# Patient Record
Sex: FEMALE | URBAN - METROPOLITAN AREA
[De-identification: names, ages, dates, MRNs, and addresses within clinical notes are randomized per-mention and may not be internally consistent; named-entity substitution may affect disease eponyms.]

---

## 2022-04-07 ENCOUNTER — APPOINTMENT (RX ONLY)
Dept: URBAN - METROPOLITAN AREA CLINIC 113 | Facility: CLINIC | Age: 42
Setting detail: DERMATOLOGY
End: 2022-04-07

## 2022-04-07 DIAGNOSIS — L81.4 OTHER MELANIN HYPERPIGMENTATION: ICD-10-CM

## 2022-04-07 DIAGNOSIS — L90.8 OTHER ATROPHIC DISORDERS OF SKIN: ICD-10-CM

## 2022-04-07 DIAGNOSIS — L64.8 OTHER ANDROGENIC ALOPECIA: ICD-10-CM

## 2022-04-07 DIAGNOSIS — L90.6 STRIAE ATROPHICAE: ICD-10-CM

## 2022-04-07 DIAGNOSIS — L70.8 OTHER ACNE: ICD-10-CM

## 2022-04-07 PROCEDURE — 99203 OFFICE O/P NEW LOW 30 MIN: CPT

## 2022-04-07 PROCEDURE — ? PRESCRIPTION

## 2022-04-07 PROCEDURE — ? DYSPORT

## 2022-04-07 PROCEDURE — ? COUNSELING

## 2022-04-07 PROCEDURE — ? TREATMENT REGIMEN

## 2022-04-07 RX ORDER — TRETIONIN 0.25 MG/G
CREAM TOPICAL
Qty: 20 | Refills: 5 | Status: ERX | COMMUNITY
Start: 2022-04-07

## 2022-04-07 RX ORDER — SPIRONOLACTONE 50 MG/1
TABLET, FILM COATED ORAL
Qty: 90 | Refills: 0 | Status: ERX | COMMUNITY
Start: 2022-04-07

## 2022-04-07 RX ADMIN — SPIRONOLACTONE: 50 TABLET, FILM COATED ORAL at 00:00

## 2022-04-07 RX ADMIN — TRETIONIN: 0.25 CREAM TOPICAL at 00:00

## 2022-04-07 ASSESSMENT — LOCATION DETAILED DESCRIPTION DERM
LOCATION DETAILED: RIGHT LATERAL BREAST 6-7:00 REGION
LOCATION DETAILED: LEFT MEDIAL BREAST 7-8:00 REGION
LOCATION DETAILED: RIGHT CENTRAL MALAR CHEEK
LOCATION DETAILED: LEFT LATERAL BREAST 5-6:00 REGION
LOCATION DETAILED: GLABELLA
LOCATION DETAILED: MID-FRONTAL SCALP
LOCATION DETAILED: SUPERIOR MID FOREHEAD
LOCATION DETAILED: LEFT CENTRAL MALAR CHEEK

## 2022-04-07 ASSESSMENT — LOCATION ZONE DERM
LOCATION ZONE: FACE
LOCATION ZONE: TRUNK
LOCATION ZONE: SCALP

## 2022-04-07 ASSESSMENT — LOCATION SIMPLE DESCRIPTION DERM
LOCATION SIMPLE: LEFT CHEEK
LOCATION SIMPLE: GLABELLA
LOCATION SIMPLE: ANTERIOR SCALP
LOCATION SIMPLE: LEFT BREAST
LOCATION SIMPLE: RIGHT CHEEK
LOCATION SIMPLE: RIGHT BREAST
LOCATION SIMPLE: SUPERIOR FOREHEAD

## 2022-04-07 NOTE — HPI: HAIR LOSS
Previous Labs: Yes
How Did The Hair Loss Occur?: gradual in onset
How Severe Is Your Hair Loss?: moderate
Lab Details: low iron and vitamin D

## 2022-04-07 NOTE — PROCEDURE: TREATMENT REGIMEN
Detail Level: Detailed
Otc Regimen: Rogaine or minoxidil solution or foam daily as directed
Initiate Treatment: Spironolactone 50mg daily
Plan: Could be a mild form of HS, but no inflammatory lesions.  Patient may manually extract lesions when they occur. If condition worsens, return for further evaluation

## 2022-04-07 NOTE — PROCEDURE: DYSPORT
Lot #: [id number]
Left Periorbital Skin Units: 0
Glabellar Complex Units: 20
Show Topical Anesthesia: Yes
Post-Care Instructions: Patient instructed to not lie down for 4 hours and limit physical activity for 24 hours.
Show Lcl Units: No
Forehead Units: 30
Expiration Date (Month Year): 09/3022
Consent: Written consent obtained. Risks include but not limited to lid/brow ptosis, bruising, swelling, diplopia, temporary effect, incomplete chemical denervation.
Dilution (U/0.1 Cc): 10
Detail Level: Detailed

## 2022-04-11 ENCOUNTER — RX ONLY (OUTPATIENT)
Age: 42
Setting detail: RX ONLY
End: 2022-04-11

## 2022-04-15 ENCOUNTER — APPOINTMENT (RX ONLY)
Dept: URBAN - METROPOLITAN AREA CLINIC 88 | Facility: CLINIC | Age: 42
Setting detail: DERMATOLOGY
End: 2022-04-15

## 2022-04-15 DIAGNOSIS — L90.8 OTHER ATROPHIC DISORDERS OF SKIN: ICD-10-CM

## 2022-04-15 PROCEDURE — ? ADDITIONAL NOTES

## 2022-04-15 PROCEDURE — ? COUNSELING

## 2022-04-15 PROCEDURE — ? DYSPORT

## 2022-04-15 ASSESSMENT — LOCATION SIMPLE DESCRIPTION DERM
LOCATION SIMPLE: SUPERIOR FOREHEAD
LOCATION SIMPLE: LEFT EYELID
LOCATION SIMPLE: RIGHT EYELID

## 2022-04-15 ASSESSMENT — LOCATION DETAILED DESCRIPTION DERM
LOCATION DETAILED: SUPERIOR MID FOREHEAD
LOCATION DETAILED: LEFT LATERAL CANTHUS
LOCATION DETAILED: RIGHT LATERAL CANTHUS

## 2022-04-15 ASSESSMENT — LOCATION ZONE DERM
LOCATION ZONE: EYELID
LOCATION ZONE: FACE

## 2022-04-15 NOTE — PROCEDURE: ADDITIONAL NOTES
Detail Level: Simple
Render Risk Assessment In Note?: no
Additional Notes: Complimentary 20 units to forehead per dr. Arriola

## 2022-04-15 NOTE — PROCEDURE: DYSPORT
Show Masseter Units: Yes
Periorbital Skin Units: 20
Show Right And Left Brow Units: No
Mentalis Units: 0
Consent: Written consent obtained. Risks include but not limited to lid/brow ptosis, bruising, swelling, diplopia, temporary effect, incomplete chemical denervation.
Price (Use Numbers Only, No Special Characters Or $): 92
Post-Care Instructions: Patient instructed to not lie down for 4 hours and limit physical activity for 24 hours.
Detail Level: Detailed
Dilution (U/0.1 Cc): 10

## 2022-04-22 ENCOUNTER — APPOINTMENT (RX ONLY)
Dept: URBAN - METROPOLITAN AREA CLINIC 88 | Facility: CLINIC | Age: 42
Setting detail: DERMATOLOGY
End: 2022-04-22

## 2022-04-22 DIAGNOSIS — Z41.9 ENCOUNTER FOR PROCEDURE FOR PURPOSES OTHER THAN REMEDYING HEALTH STATE, UNSPECIFIED: ICD-10-CM

## 2022-04-22 PROCEDURE — ? ADDITIONAL NOTES

## 2022-04-22 NOTE — PROCEDURE: ADDITIONAL NOTES
Render Risk Assessment In Note?: no
Detail Level: Simple
Additional Notes: Pt was given complimentary 10 units per Li.

## 2022-04-22 NOTE — PROCEDURE: MIPS QUALITY
Detail Level: Detailed
Quality 402: Tobacco Use And Help With Quitting Among Adolescents: Tobacco Screening OR Tobacco Cessation Intervention not Performed Reason Not Otherwise Specified
Quality 226: Preventive Care And Screening: Tobacco Use: Screening And Cessation Intervention: Tobacco Screening not Performed for Medical Reasons

## 2023-01-20 ENCOUNTER — RX ONLY (OUTPATIENT)
Age: 43
Setting detail: RX ONLY
End: 2023-01-20

## 2023-01-20 ENCOUNTER — APPOINTMENT (RX ONLY)
Dept: URBAN - METROPOLITAN AREA CLINIC 88 | Facility: CLINIC | Age: 43
Setting detail: DERMATOLOGY
End: 2023-01-20

## 2023-01-20 DIAGNOSIS — Z41.9 ENCOUNTER FOR PROCEDURE FOR PURPOSES OTHER THAN REMEDYING HEALTH STATE, UNSPECIFIED: ICD-10-CM

## 2023-01-20 DIAGNOSIS — L90.8 OTHER ATROPHIC DISORDERS OF SKIN: ICD-10-CM

## 2023-01-20 DIAGNOSIS — L65.9 NONSCARRING HAIR LOSS, UNSPECIFIED: ICD-10-CM

## 2023-01-20 PROCEDURE — 99213 OFFICE O/P EST LOW 20 MIN: CPT

## 2023-01-20 PROCEDURE — ? DYSPORT

## 2023-01-20 PROCEDURE — ? ADDITIONAL NOTES

## 2023-01-20 PROCEDURE — ? TREATMENT REGIMEN

## 2023-01-20 PROCEDURE — ? COUNSELING

## 2023-01-20 RX ORDER — SPIRONOLACTONE 50 MG/1
TABLET, FILM COATED ORAL
Qty: 90 | Refills: 0 | Status: ERX | COMMUNITY
Start: 2023-01-20

## 2023-01-20 RX ORDER — TRETIONIN 0.5 MG/G
CREAM TOPICAL
Qty: 20 | Refills: 0 | Status: ERX | COMMUNITY
Start: 2023-01-20

## 2023-01-20 ASSESSMENT — LOCATION ZONE DERM
LOCATION ZONE: FACE
LOCATION ZONE: SCALP

## 2023-01-20 ASSESSMENT — LOCATION DETAILED DESCRIPTION DERM
LOCATION DETAILED: RIGHT SUPERIOR CENTRAL MALAR CHEEK
LOCATION DETAILED: GLABELLA
LOCATION DETAILED: LEFT SUPERIOR OCCIPITAL SCALP
LOCATION DETAILED: RIGHT INFERIOR CENTRAL MALAR CHEEK
LOCATION DETAILED: LEFT SUPERIOR LATERAL MALAR CHEEK
LOCATION DETAILED: RIGHT FOREHEAD
LOCATION DETAILED: RIGHT SUPERIOR PARIETAL SCALP
LOCATION DETAILED: LEFT INFERIOR PREAURICULAR CHEEK
LOCATION DETAILED: LEFT INFERIOR FOREHEAD

## 2023-01-20 ASSESSMENT — LOCATION SIMPLE DESCRIPTION DERM
LOCATION SIMPLE: RIGHT CHEEK
LOCATION SIMPLE: LEFT OCCIPITAL SCALP
LOCATION SIMPLE: LEFT CHEEK
LOCATION SIMPLE: LEFT FOREHEAD
LOCATION SIMPLE: GLABELLA
LOCATION SIMPLE: SCALP
LOCATION SIMPLE: RIGHT FOREHEAD

## 2023-01-20 NOTE — PROCEDURE: TREATMENT REGIMEN
Initiate Treatment: Spironolactone 50mg once daily \\nMinoxidil solution once daily
Detail Level: Zone

## 2023-01-20 NOTE — PROCEDURE: ADDITIONAL NOTES
Additional Notes: Patient is a good candidate for thread lifting in the cheeks and quoted $1000 for procedure in addition to cost of each thread. Patient aware many threads will be needed. \\nPatient recommended to have a face lift with plastic surgeon for the type of correction she is looking for.
Render Risk Assessment In Note?: no
Detail Level: Simple

## 2023-01-20 NOTE — PROCEDURE: DYSPORT
R Brow Units: 0
Detail Level: Simple
Show Lateral Platysmal Band Units: Yes
Glabellar Complex Units: 30
Show Ucl Units: No
Show Inventory Tab: Show
Consent: Written consent obtained. Risks include but not limited to lid/brow ptosis, bruising, swelling, diplopia, temporary effect, incomplete chemical denervation.
Post-Care Instructions: Patient instructed to not lie down for 4 hours and limit physical activity for 24 hours.
Dilution (U/0.1 Cc): 10
Topical Anesthesia?: ice
Forehead Units: 40

## 2023-02-17 ENCOUNTER — APPOINTMENT (RX ONLY)
Dept: URBAN - METROPOLITAN AREA CLINIC 88 | Facility: CLINIC | Age: 43
Setting detail: DERMATOLOGY
End: 2023-02-17

## 2023-02-17 DIAGNOSIS — Z41.9 ENCOUNTER FOR PROCEDURE FOR PURPOSES OTHER THAN REMEDYING HEALTH STATE, UNSPECIFIED: ICD-10-CM

## 2023-02-17 PROCEDURE — ? DEFER

## 2023-02-17 PROCEDURE — ? ADDITIONAL NOTES

## 2023-02-17 PROCEDURE — ? RHA REDENSITY INJECTION

## 2023-02-17 PROCEDURE — ? INVENTORY

## 2023-02-17 PROCEDURE — ? DYSPORT

## 2023-02-17 NOTE — PROCEDURE: DEFER
X Size Of Lesion In Cm (Optional): 0
Introduction Text (Please End With A Colon): The following procedure was deferred:
Detail Level: Detailed
Procedure To Be Performed At Next Visit: Filler: Restylane
Other Procedure: Quan Gutierrez
Instructions (Optional): Patient quoted $750 per syringe of filler for the cheeks, recommended at least 2 syringes.

## 2023-02-17 NOTE — PROCEDURE: DYSPORT
Left Pupillary Line Units: 0
Show Masseter Units: Yes
Post-Care Instructions: Patient instructed to not lie down for 4 hours and limit physical activity for 24 hours.\\n\\nLot #: [id number]\\nExpiration Date: 4/30/2023
Show Right And Left Periorbital Units: No
Detail Level: Simple
Dilution (U/0.1 Cc): 10
Show Inventory Tab: Show
Forehead Units: 15
Consent: Verbal consent obtained. Risks include but not limited to lid/brow ptosis, bruising, swelling, diplopia, temporary effect, incomplete chemical denervation.

## 2023-02-17 NOTE — PROCEDURE: ADDITIONAL NOTES
Detail Level: Simple
Additional Notes: Lady Lift Plan\\nDate: 2/17/23\\nTreatment Plan: 15 units of Dysport, 1cc of RHA Redensity\\nQuote: $637.50\\nPaid Today: $637.50\\nBalance Owe: $0
Render Risk Assessment In Note?: no
Additional Notes: Patient has passed 2 week Dysport window. Will provide patient with 50% discount on Dysport only per Dr. Arriola.

## 2023-02-17 NOTE — PROCEDURE: RHA REDENSITY INJECTION
Number Of Syringes (Required For Inventory): 1
Additional Area 5 Volume In Cc: 0
Show Inventory Tab: Hide
Topical Anesthesia?: 7% tetracaine, 23% lidocaine
Map Statment: See Attach Map for Complete Details
Consent: Verbal consent obtained. Risks include but not limited to bruising, beading, irregular texture, ulceration, infection, allergic reaction, scar formation, incomplete augmentation, temporary nature, procedural pain.
Procedural Text: The filler was administered to the treatment areas noted above.
Detail Level: Detailed
Filler: RHA Redensity
Post-Care Instructions: Patient instructed to apply ice to reduce swelling.\\n\\nLot #: [id number]\\nExpiration Date: 3/1/2025
Include Cannula Information In Note?: No

## 2023-08-25 ENCOUNTER — APPOINTMENT (RX ONLY)
Dept: URBAN - METROPOLITAN AREA CLINIC 88 | Facility: CLINIC | Age: 43
Setting detail: DERMATOLOGY
End: 2023-08-25

## 2023-08-25 DIAGNOSIS — Z41.9 ENCOUNTER FOR PROCEDURE FOR PURPOSES OTHER THAN REMEDYING HEALTH STATE, UNSPECIFIED: ICD-10-CM

## 2023-08-25 PROCEDURE — ? DYSPORT

## 2023-08-25 PROCEDURE — ? INVENTORY

## 2023-08-25 ASSESSMENT — LOCATION DETAILED DESCRIPTION DERM
LOCATION DETAILED: RIGHT FOREHEAD
LOCATION DETAILED: GLABELLA
LOCATION DETAILED: LEFT FOREHEAD

## 2023-08-25 ASSESSMENT — LOCATION SIMPLE DESCRIPTION DERM
LOCATION SIMPLE: GLABELLA
LOCATION SIMPLE: RIGHT FOREHEAD
LOCATION SIMPLE: LEFT FOREHEAD

## 2023-08-25 ASSESSMENT — LOCATION ZONE DERM: LOCATION ZONE: FACE

## 2023-08-25 NOTE — PROCEDURE: DYSPORT
R Brow Units: 0
Show Orbicularis Oculi Units: Yes
Detail Level: Simple
Show Right And Left Pupillary Line Units: No
Show Inventory Tab: Show
Consent: Written consent obtained. Risks include but not limited to lid/brow ptosis, bruising, swelling, diplopia, temporary effect, incomplete chemical denervation.
Post-Care Instructions: Patient instructed to not lie down for 4 hours and limit physical activity for 24 hours.
Dilution (U/0.1 Cc): 10
Forehead Units: 40

## 2024-01-05 ENCOUNTER — APPOINTMENT (RX ONLY)
Dept: URBAN - METROPOLITAN AREA CLINIC 88 | Facility: CLINIC | Age: 44
Setting detail: DERMATOLOGY
End: 2024-01-05

## 2024-01-05 DIAGNOSIS — Z41.9 ENCOUNTER FOR PROCEDURE FOR PURPOSES OTHER THAN REMEDYING HEALTH STATE, UNSPECIFIED: ICD-10-CM

## 2024-01-05 PROCEDURE — ? ADDITIONAL NOTES

## 2024-01-05 PROCEDURE — ? INVENTORY

## 2024-01-05 NOTE — PROCEDURE: ADDITIONAL NOTES
Render Risk Assessment In Note?: no
Additional Notes: Filler, 2cc Restalyne Lyft in Cheeks\\nLot #: [id number]\\nExp Date: 8/31/2024\\n\\nDysport, 70 units, F= 40, G= 30\\nLot #: [id number]\\nExp Date: 6/30/2025
Detail Level: Simple

## 2024-01-26 ENCOUNTER — APPOINTMENT (RX ONLY)
Dept: URBAN - METROPOLITAN AREA CLINIC 88 | Facility: CLINIC | Age: 44
Setting detail: DERMATOLOGY
End: 2024-01-26

## 2024-01-26 DIAGNOSIS — Z41.9 ENCOUNTER FOR PROCEDURE FOR PURPOSES OTHER THAN REMEDYING HEALTH STATE, UNSPECIFIED: ICD-10-CM

## 2024-01-26 PROCEDURE — ? ADDITIONAL NOTES

## 2024-01-26 PROCEDURE — ? VERSA INJECTION

## 2024-01-26 PROCEDURE — ? INVENTORY

## 2024-01-26 ASSESSMENT — LOCATION DETAILED DESCRIPTION DERM
LOCATION DETAILED: LEFT CENTRAL MALAR CHEEK
LOCATION DETAILED: RIGHT CENTRAL MALAR CHEEK

## 2024-01-26 ASSESSMENT — LOCATION SIMPLE DESCRIPTION DERM
LOCATION SIMPLE: LEFT CHEEK
LOCATION SIMPLE: RIGHT CHEEK

## 2024-01-26 ASSESSMENT — LOCATION ZONE DERM: LOCATION ZONE: FACE

## 2024-01-26 NOTE — PROCEDURE: VERSA INJECTION
Brows Filler Volume In Cc: 0
Cheeks Filler Volume In Cc: 1
Filler: Versa
Anesthesia Type: 1% lidocaine with epinephrine
Detail Level: Detailed
Additional Anesthesia Volume In Cc: 6
Use Map Statement For Sites (Optional): No
Consent: Written consent obtained. Risks include but not limited to bruising, beading, irregular texture, ulceration, infection, allergic reaction, scar formation, incomplete augmentation, temporary nature, procedural pain.
Procedural Text: The filler was administered to the treatment areas noted above.
Anesthesia Volume In Cc: 0.5
Show Inventory Tab: Hide
Post-Care Instructions: Patient instructed to apply ice to reduce swelling.
Map Statment: See Attach Map for Complete Details

## 2024-01-26 NOTE — PROCEDURE: ADDITIONAL NOTES
Render Risk Assessment In Note?: no
Additional Notes: Filler, 1cc Restalyne Lyft in Cheeks\\nLot #: [id number]\\nExp Date: 4/18/2025
Detail Level: Simple

## 2024-04-01 ENCOUNTER — APPOINTMENT (RX ONLY)
Dept: URBAN - METROPOLITAN AREA CLINIC 88 | Facility: CLINIC | Age: 44
Setting detail: DERMATOLOGY
End: 2024-04-01

## 2024-04-01 DIAGNOSIS — Z41.9 ENCOUNTER FOR PROCEDURE FOR PURPOSES OTHER THAN REMEDYING HEALTH STATE, UNSPECIFIED: ICD-10-CM

## 2024-04-01 PROCEDURE — ? DYSPORT

## 2024-04-01 PROCEDURE — ? INVENTORY

## 2024-04-01 ASSESSMENT — LOCATION DETAILED DESCRIPTION DERM
LOCATION DETAILED: RIGHT FOREHEAD
LOCATION DETAILED: LEFT FOREHEAD

## 2024-04-01 ASSESSMENT — LOCATION SIMPLE DESCRIPTION DERM
LOCATION SIMPLE: RIGHT FOREHEAD
LOCATION SIMPLE: LEFT FOREHEAD

## 2024-04-01 ASSESSMENT — LOCATION ZONE DERM: LOCATION ZONE: FACE

## 2025-01-16 ENCOUNTER — APPOINTMENT (OUTPATIENT)
Dept: URBAN - METROPOLITAN AREA CLINIC 88 | Facility: CLINIC | Age: 45
Setting detail: DERMATOLOGY
End: 2025-01-16

## 2025-01-16 DIAGNOSIS — Z41.9 ENCOUNTER FOR PROCEDURE FOR PURPOSES OTHER THAN REMEDYING HEALTH STATE, UNSPECIFIED: ICD-10-CM

## 2025-01-16 PROCEDURE — ? INVENTORY

## 2025-01-16 PROCEDURE — ? DYSPORT

## 2025-01-16 ASSESSMENT — LOCATION SIMPLE DESCRIPTION DERM
LOCATION SIMPLE: LEFT FOREHEAD
LOCATION SIMPLE: RIGHT FOREHEAD
LOCATION SIMPLE: INFERIOR FOREHEAD

## 2025-01-16 ASSESSMENT — LOCATION DETAILED DESCRIPTION DERM
LOCATION DETAILED: RIGHT FOREHEAD
LOCATION DETAILED: INFERIOR MID FOREHEAD
LOCATION DETAILED: LEFT FOREHEAD

## 2025-01-16 ASSESSMENT — LOCATION ZONE DERM: LOCATION ZONE: FACE

## 2025-04-04 ENCOUNTER — APPOINTMENT (OUTPATIENT)
Dept: URBAN - METROPOLITAN AREA CLINIC 88 | Facility: CLINIC | Age: 45
Setting detail: DERMATOLOGY
End: 2025-04-04

## 2025-04-04 DIAGNOSIS — Z41.9 ENCOUNTER FOR PROCEDURE FOR PURPOSES OTHER THAN REMEDYING HEALTH STATE, UNSPECIFIED: ICD-10-CM

## 2025-04-04 PROCEDURE — ? DYSPORT

## 2025-04-04 PROCEDURE — ? INVENTORY

## 2025-04-04 ASSESSMENT — LOCATION SIMPLE DESCRIPTION DERM
LOCATION SIMPLE: INFERIOR FOREHEAD
LOCATION SIMPLE: RIGHT FOREHEAD
LOCATION SIMPLE: LEFT FOREHEAD

## 2025-04-04 ASSESSMENT — LOCATION ZONE DERM: LOCATION ZONE: FACE

## 2025-04-04 ASSESSMENT — LOCATION DETAILED DESCRIPTION DERM
LOCATION DETAILED: INFERIOR MID FOREHEAD
LOCATION DETAILED: RIGHT FOREHEAD
LOCATION DETAILED: LEFT FOREHEAD

## 2025-04-04 NOTE — PROCEDURE: DYSPORT
R Brow Units: 0
Show Orbicularis Oculi Units: Yes
Detail Level: Simple
Show Right And Left Pupillary Line Units: No
Show Inventory Tab: Show
Consent: Written consent obtained. Risks include but not limited to lid/brow ptosis, bruising, swelling, diplopia, temporary effect, incomplete chemical denervation.
Post-Care Instructions: Patient instructed to not lie down for 4 hours and limit physical activity for 24 hours.
Dilution (U/0.1 Cc): 10
Forehead Units: 50

## 2025-04-11 ENCOUNTER — APPOINTMENT (OUTPATIENT)
Dept: URBAN - METROPOLITAN AREA CLINIC 88 | Facility: CLINIC | Age: 45
Setting detail: DERMATOLOGY
End: 2025-04-11

## 2025-04-11 DIAGNOSIS — Z41.9 ENCOUNTER FOR PROCEDURE FOR PURPOSES OTHER THAN REMEDYING HEALTH STATE, UNSPECIFIED: ICD-10-CM

## 2025-04-11 PROCEDURE — ? ADDITIONAL NOTES

## 2025-04-11 PROCEDURE — ? COSMETIC FOLLOW-UP

## 2025-04-11 ASSESSMENT — LOCATION DETAILED DESCRIPTION DERM: LOCATION DETAILED: INFERIOR MID FOREHEAD

## 2025-04-11 ASSESSMENT — LOCATION ZONE DERM: LOCATION ZONE: FACE

## 2025-04-11 ASSESSMENT — LOCATION SIMPLE DESCRIPTION DERM: LOCATION SIMPLE: INFERIOR FOREHEAD

## 2025-04-11 NOTE — PROCEDURE: ADDITIONAL NOTES
Detail Level: Simple
Render Risk Assessment In Note?: no
Additional Notes: Additional units added on forehead:\\n10 units \\nFrom Dr. AHMADI inventory \\nLOT:[id number]\\nEXP:12/31/2025

## 2025-05-02 ENCOUNTER — APPOINTMENT (OUTPATIENT)
Dept: URBAN - METROPOLITAN AREA CLINIC 88 | Facility: CLINIC | Age: 45
Setting detail: DERMATOLOGY
End: 2025-05-02

## 2025-05-02 DIAGNOSIS — Z41.9 ENCOUNTER FOR PROCEDURE FOR PURPOSES OTHER THAN REMEDYING HEALTH STATE, UNSPECIFIED: ICD-10-CM

## 2025-05-02 PROCEDURE — ? INVENTORY

## 2025-05-02 PROCEDURE — ? ADDITIONAL NOTES

## 2025-05-02 ASSESSMENT — LOCATION ZONE DERM: LOCATION ZONE: FACE

## 2025-05-02 ASSESSMENT — LOCATION DETAILED DESCRIPTION DERM: LOCATION DETAILED: INFERIOR MID FOREHEAD

## 2025-05-02 ASSESSMENT — LOCATION SIMPLE DESCRIPTION DERM: LOCATION SIMPLE: INFERIOR FOREHEAD

## 2025-05-02 NOTE — PROCEDURE: ADDITIONAL NOTES
Detail Level: Simple
Render Risk Assessment In Note?: no
Additional Notes: * Additional units added from Dr. Arirola’s inventory (patient was charged 50% since it is after free touch up window)\\n\\n- Forehead: 10 units\\n- Lot: [id number]\\n- Exp: 05/31/26